# Patient Record
Sex: MALE | Race: WHITE | NOT HISPANIC OR LATINO | Employment: UNEMPLOYED | URBAN - METROPOLITAN AREA
[De-identification: names, ages, dates, MRNs, and addresses within clinical notes are randomized per-mention and may not be internally consistent; named-entity substitution may affect disease eponyms.]

---

## 2022-04-29 ENCOUNTER — HOSPITAL ENCOUNTER (EMERGENCY)
Facility: HOSPITAL | Age: 37
Discharge: HOME | End: 2022-04-29
Attending: EMERGENCY MEDICINE

## 2022-04-29 VITALS
HEART RATE: 103 BPM | DIASTOLIC BLOOD PRESSURE: 94 MMHG | TEMPERATURE: 98 F | OXYGEN SATURATION: 90 % | RESPIRATION RATE: 10 BRPM | SYSTOLIC BLOOD PRESSURE: 194 MMHG

## 2022-04-29 DIAGNOSIS — T40.1X1A ACCIDENTAL OVERDOSE OF HEROIN, INITIAL ENCOUNTER (HCC): Primary | ICD-10-CM

## 2022-04-29 PROCEDURE — 99284 EMERGENCY DEPT VISIT MOD MDM: CPT

## 2022-04-29 PROCEDURE — 93005 ELECTROCARDIOGRAM TRACING: CPT

## 2022-04-29 PROCEDURE — 82948 REAGENT STRIP/BLOOD GLUCOSE: CPT

## 2022-04-29 PROCEDURE — 99291 CRITICAL CARE FIRST HOUR: CPT | Performed by: EMERGENCY MEDICINE

## 2022-04-29 NOTE — Clinical Note
Date: 4/29/2022  Patient: Darion Gann  Admitted: 4/29/2022 11:44 AM  Attending Provider: Bin Vargas MD    Darion Gann or his authorized caregiver has made the decision for the patient to leave the emergency department against the advice of h is attending physician  He or his authorized caregiver has been informed and understands the inherent risks, including death, hypoxemia, brain injury  He or his authorized caregiver has decided to accept the responsibility for this decision  Marvene Demetrius nnor and all necessary parties have been advised that he may return for further evaluation or treatment  His condition at time of discharge was guarded    Darion Gann had current vital signs as follows:  BP (!) 194/94 (BP Location: Left arm)   Pulse  103   Temp 98 °F (36 7 °C) (Oral)   Resp (!) 10

## 2022-04-29 NOTE — ED PROVIDER NOTES
Pt Name: Ovidio Balderas  MRN: [id number]  [place] 1985  Age/Sex: 39 y o  male  Date of evaluation: 4/29/2022  PCP: No primary care provider on file  CHIEF COMPLAINT    Chief Complaint   Patient presents with    Overdose - Accidental     Pt brought to front of ED unresponsive and cyanotic  Friend states he believes it was an accidental OD on Heroin  HPI    39 y o  male brought in by friend unresponsive and cyanotic  Patient's friend states that he thinks he may have taken heroin, drove him to the hospital   Patient was witnessed to be unresponsive in the car and cyanotic, was pulled out of the car, noted to hit head while being pulled out of the car  After administration of Narcan intranasally, patient recovered was able to provide further history  He states that he snorted 1 bag of heroin, states that he does not usually do this  He denies any symptoms at this time fluid headache, numbness, weakness, chest pain, shortness of breath, abdominal pain, other symptoms  HPI      Past Medical and Surgical History    No past medical history on file  No past surgical history on file  No family history on file  Social History     Tobacco Use    Smoking status: Not on file    Smokeless tobacco: Not on file   Substance Use Topics    Alcohol use: Not on file    Drug use: Not on file           Allergies    Not on File    Home Medications    Prior to Admission medications    Not on File           Review of Systems  Note: review of systems obtained after Narcan administered and patient conscious  Review of Systems   Constitutional: Negative for appetite change, chills and diaphoresis  HENT: Negative for drooling, facial swelling, trouble swallowing and voice change  Respiratory: Negative for apnea, shortness of breath and wheezing  Cardiovascular: Negative for chest pain and leg swelling     Gastrointestinal: Negative for abdominal distention, abdominal pain, diarrhea, nausea and vomiting  Genitourinary: Negative for dysuria and urgency  Musculoskeletal: Negative for arthralgias, back pain, gait problem and neck pain  Skin: Negative for color change, rash and wound  Neurological: Positive for syncope  Negative for seizures, speech difficulty, weakness and headaches  Psychiatric/Behavioral: Negative for agitation, behavioral problems and dysphoric mood  The patient is not nervous/anxious  All other systems reviewed and negative  Physical Exam      ED Triage Vitals [04/29/22 1145]   Temperature Pulse Respirations Blood Pressure SpO2   98 °F (36 7 °C) 103 (!) 10 (!) 194/94 90 %      Temp Source Heart Rate Source Patient Position - Orthostatic VS BP Location FiO2 (%)   Oral Monitor Sitting Left arm --      Pain Score       --               Physical Exam  Vitals and nursing note reviewed  Constitutional:       Appearance: He is well-developed  He is ill-appearing and diaphoretic  Comments: Obtunded   HENT:      Head: Normocephalic and atraumatic  Right Ear: External ear normal       Left Ear: External ear normal    Eyes:      Conjunctiva/sclera: Conjunctivae normal       Comments: Pinpoint pupils, not reactive to light   Neck:      Trachea: No tracheal deviation  Cardiovascular:      Rate and Rhythm: Normal rate and regular rhythm  Heart sounds: Normal heart sounds  No murmur heard  Pulmonary:      Effort: No respiratory distress  Breath sounds: Normal breath sounds  No stridor  No wheezing or rales  Comments: Breath sounds clear bilaterally, depressed respirations on initial exam  Abdominal:      General: There is no distension  Palpations: Abdomen is soft  Tenderness: There is no abdominal tenderness  There is no guarding or rebound  Musculoskeletal:         General: No deformity  Normal range of motion  Cervical back: Normal range of motion and neck supple  Skin:     General: Skin is warm  Findings: No rash  Neurological:      Comments: Unresponsive to pain   Psychiatric:         Behavior: Behavior normal          Thought Content: Thought content normal          Judgment: Judgment normal      After administration Narcan, patient's pupil exam normalized, became awake alert oriented x3, no focal neurologic deficits with cranial nerves 2-12 intact, 5/5 strength in all extremities, ambulating with normal steady gait without difficulty or assistance, normal speech and coordination  Patient adamantly denies any suicidal or homicidal ideation, states he was attempting to get high and did not intend to overdose  Diagnostic Results    EKG Interpretation    Rate:  99  BPM  Rhythm:  Normal Sinus Rhythm   Axis:  Normal   Intervals: Normal, no blocks, QTc  423 ms  Q waves:  No pathologic Q waves   T waves: Inverted in inferior leads  ST segments:  Slight depressions in inferior leads  Impression:  Normal sinus rhythm with ST depression in inferior leads concerning for possible inferior ischemia without evidence of acute ischemia or significant arrhythmia      EKG for comparison:  None available    EKG interpreted by me       Labs:    Results Reviewed     None          All labs reviewed and utilized in the medical decision making process    Radiology:    No orders to display       All radiology studies independently viewed by me and interpreted by the radiologist     Procedure    CriticalCare Time  Performed by: Yared Alcantara MD  Authorized by: Yared Alcantara MD     Critical care provider statement:     Critical care time (minutes):  35    Critical care time was exclusive of:  Separately billable procedures and treating other patients and teaching time    Critical care was necessary to treat or prevent imminent or life-threatening deterioration of the following conditions:  CNS failure or compromise and respiratory failure    Critical care was time spent personally by me on the following activities:  Obtaining history from patient or surrogate, development of treatment plan with patient or surrogate, evaluation of patient's response to treatment, examination of patient, ordering and performing treatments and interventions and re-evaluation of patient's condition  Comments:      Critical care time also included discussions with patient about plan of care and counseling regarding home Narcan use as well as outpatient substance abuse resources after patient left against medical advice but prior to departure            ED Course of Care and Re-Assessments      After waking up, patient refused any further interventions, removed leads as well as IVs   CT scan of the head as well as blood work offered, patient refused same  Despite multiple staff members explaining risks and requesting that he stay for at least a brief observation patient declined, requesting to leave immediately  We had a thorough discussion of risks and benefits, patient was able to restate risks in his own words, particularly risk of bounce-back narcotic affect is Narcan wears off with respiratory depression potentially causing brain damage, disability, or death  We also discussed long-term risks of drug abuse, patient was offered crisis evaluation as well as outpatient resources and declined all  At the end of this discussion, patient remained firmly wished to leave immediately, although he did except home dispense Narcan from myself after discharge, he was counseled on its use and states that he will have someone who can watch him and use it on him if needed  Medications   naloxone nasal- Given to patient by provider at discharge  (NARCAN) 4 mg/0 1 mL nasal spray 4 mg (has no administration in time range)           FINAL IMPRESSION    Final diagnoses:   Accidental overdose of heroin, initial encounter [facility])         DISPOSITION/PLAN    Presentation as above felt most consistent with accidental heroin overdose    Vital signs and examination remarkable for significant abnormalities as above, with substantial improvement after administration of Narcan  Unfortunately, patient declined any further medical workup or further observation the ER as above, declined any crisis or substance abuse resources but fortunately did it least except community dispense Narcan  Patient left against medical advice as above, demonstrate capacity to make that decision based on the content of our conversation and his ability to manipulate information and form an alternate plan of care of observation by friend with home Narcan which he understands is inferior to observation in the hospital regarding safety  Ambulating without difficulty or assistance with normal steady gait at time of departure  Time reflects when diagnosis was documented in both MDM as applicable and the Disposition within this note     Time User Action Codes Description Comment    4/29/2022 11:58 AM Maira ARIZA Add [T40 1X1A] Accidental overdose of heroin, initial encounter [facility])       ED Disposition     ED Disposition Condition Date/Time Comment    [facility]  Fri Apr 29, 2022 12:00 PM Date: 4/29/2022  Patient: Fady Castellano  Admitted: 4/29/2022 11:44 AM  Attending Provider: MD Fady Rodriguez or his authorized caregiver has made the decision for the patient to leave the emergency department against the advice of h is attending physician  He or his authorized caregiver has been informed and understands the inherent risks, including death, hypoxemia, brain injury  He or his authorized caregiver has decided to accept the responsibility for this decision  Rusty osman and all necessary parties have been advised that he may return for further evaluation or treatment  His condition at time of discharge was guarded    Rolandmanpreet Gray had current vital signs as follows:  BP (!) 194/94 (BP Location: Left arm)   Pulse  103   Temp 98 °F (36 7 °C) (Oral)   Resp (!) 10         Follow-up Information     Follow up With Specialties Details Why Contact Info Additional Information    [facility] Emergency Department Emergency Medicine Go to  If you change your mind at any point or develop concerning symptoms [address] [facility] [address] [facility], [address] [place] [address], [address] TO:    [facility] Emergency Department  [address] [facility] [address]  [phone]  Go to   If you change your mind at any point or develop concerning symptoms      DISCHARGE MEDICATIONS:    Patient's Medications    No medications on file       No discharge procedures on file  Sanjuanita Sims MD    Portions of the record may have been created with voice recognition software  Occasional wrong word or "sound alike" substitutions may have occurred due to the inherent limitations of voice recognition software    Please read the chart carefully and recognize, using context, where substitutions have occurred     Sanjuanita Sims MD  04/29/22 6539

## 2022-04-30 LAB — GLUCOSE SERPL-MCNC: 190 MG/DL (ref 65–140)

## 2022-05-01 LAB
ATRIAL RATE: 99 BPM
P AXIS: 56 DEGREES
PR INTERVAL: 162 MS
QRS AXIS: 82 DEGREES
QRSD INTERVAL: 94 MS
QT INTERVAL: 330 MS
QTC INTERVAL: 423 MS
T WAVE AXIS: -18 DEGREES
VENTRICULAR RATE: 99 BPM

## 2022-05-01 PROCEDURE — 93010 ELECTROCARDIOGRAM REPORT: CPT | Performed by: INTERNAL MEDICINE
